# Patient Record
Sex: FEMALE | Employment: FULL TIME | URBAN - METROPOLITAN AREA
[De-identification: names, ages, dates, MRNs, and addresses within clinical notes are randomized per-mention and may not be internally consistent; named-entity substitution may affect disease eponyms.]

---

## 2020-05-29 ENCOUNTER — HOSPITAL ENCOUNTER (OUTPATIENT)
Facility: MEDICAL CENTER | Age: 55
End: 2020-05-29
Payer: COMMERCIAL

## 2020-06-03 LAB
SARS-COV-2 RNA SPEC QL NAA+PROBE: NOT DETECTED
SPECIMEN SOURCE: NORMAL

## 2022-10-01 ENCOUNTER — OFFICE VISIT (OUTPATIENT)
Dept: URGENT CARE | Facility: CLINIC | Age: 57
End: 2022-10-01
Payer: COMMERCIAL

## 2022-10-01 VITALS
WEIGHT: 148.8 LBS | SYSTOLIC BLOOD PRESSURE: 122 MMHG | BODY MASS INDEX: 28.09 KG/M2 | HEIGHT: 61 IN | RESPIRATION RATE: 18 BRPM | HEART RATE: 71 BPM | DIASTOLIC BLOOD PRESSURE: 74 MMHG | TEMPERATURE: 96.8 F | OXYGEN SATURATION: 95 %

## 2022-10-01 DIAGNOSIS — M54.9 MID-BACK PAIN, ACUTE: ICD-10-CM

## 2022-10-01 PROCEDURE — 99204 OFFICE O/P NEW MOD 45 MIN: CPT | Performed by: NURSE PRACTITIONER

## 2022-10-01 RX ORDER — IBUPROFEN 800 MG/1
800 TABLET ORAL EVERY 8 HOURS PRN
Qty: 90 TABLET | Refills: 0 | Status: SHIPPED | OUTPATIENT
Start: 2022-10-01

## 2022-10-01 RX ORDER — CYCLOBENZAPRINE HCL 10 MG
10 TABLET ORAL
Qty: 20 TABLET | Refills: 0 | Status: SHIPPED | OUTPATIENT
Start: 2022-10-01

## 2022-10-01 NOTE — LETTER
October 1, 2022        Edwin oseguera Cecilia Veliz  [address]  [place] [address]        Ms. Vish Veliz was seen in our clinic today and she is excused from work for tomorrow, Monday and Tuesday. Thank you.  If you have any questions or concerns, please don't hesitate to call.        Sincerely,        CLARISSE Aldridge.P.ALISSA.    Electronically Signed

## 2022-10-16 ASSESSMENT — ENCOUNTER SYMPTOMS
CHILLS: 0
MYALGIAS: 1
FEVER: 0
ABDOMINAL PAIN: 0
FOCAL WEAKNESS: 0
BACK PAIN: 1
TINGLING: 0
SENSORY CHANGE: 0

## 2022-10-16 NOTE — PROGRESS NOTES
Subjective     Ma de Cecilia Veliz is a 57 y.o. female who presents with Back Pain (Pt has back pain, rib pain hurts when taking a breath x 1 month)            HPI New.  Patient is a 57-year-old female who presents with mid back pain that radiates around to her ribs x1 month.  She states her washing machine was going out of balance and she tried to hold onto it and this is when her back pain started.  She denies any radiation of this pain down either one of her legs, bowel or bladder issues, chest pain, or focal weakness.  She reports that she has been doing ibuprofen and icing to this area with no improvement in her symptoms however she does work as a .    Patient has no known allergies.  No current outpatient medications on file prior to visit.     No current facility-administered medications on file prior to visit.     Social History     Socioeconomic History    Marital status: Unknown     Spouse name: Not on file    Number of children: Not on file    Years of education: Not on file    Highest education level: Not on file   Occupational History    Not on file   Tobacco Use    Smoking status: Never    Smokeless tobacco: Never   Vaping Use    Vaping Use: Never used   Substance and Sexual Activity    Alcohol use: Not Currently    Drug use: Not Currently    Sexual activity: Not on file   Other Topics Concern    Not on file   Social History Narrative    Not on file     Social Determinants of Health     Financial Resource Strain: Not on file   Food Insecurity: Not on file   Transportation Needs: Not on file   Physical Activity: Not on file   Stress: Not on file   Social Connections: Not on file   Intimate Partner Violence: Not on file   Housing Stability: Not on file     Breast Cancer-related family history is not on file.      Review of Systems   Constitutional:  Negative for chills and fever.   Cardiovascular:  Negative for chest pain.   Gastrointestinal:  Negative for abdominal pain.  "  Musculoskeletal:  Positive for back pain and myalgias.   Neurological:  Negative for tingling, sensory change and focal weakness.            Objective     /74 (BP Location: Left arm, Patient Position: Sitting, BP Cuff Size: Adult)   Pulse 71   Temp 36 °C (96.8 °F) (Temporal)   Resp 18   Ht 1.54 m (5' 0.63\")   Wt 67.5 kg (148 lb 12.8 oz)   SpO2 95%   BMI 28.46 kg/m²      Physical Exam  Constitutional:       Appearance: Normal appearance.   Cardiovascular:      Rate and Rhythm: Normal rate and regular rhythm.      Heart sounds: No murmur heard.  Pulmonary:      Effort: Pulmonary effort is normal.      Breath sounds: Normal breath sounds.   Musculoskeletal:      Thoracic back: Tenderness present. No swelling, deformity, lacerations or bony tenderness. Normal range of motion.   Skin:     General: Skin is warm and dry.   Neurological:      General: No focal deficit present.      Mental Status: She is alert and oriented to person, place, and time.   Psychiatric:         Mood and Affect: Mood normal.         Behavior: Behavior normal.                           Assessment & Plan        1. Mid-back pain, acute  ibuprofen (MOTRIN) 800 MG Tab    cyclobenzaprine (FLEXERIL) 10 mg Tab    Referral to Physical Therapy    Referral to establish with [facility]        Differential diagnosis, natural history, supportive care, and indications for immediate follow-up discussed at length.           "

## 2024-01-12 ENCOUNTER — APPOINTMENT (OUTPATIENT)
Dept: RADIOLOGY | Facility: IMAGING CENTER | Age: 59
End: 2024-01-12
Payer: COMMERCIAL

## 2024-01-12 ENCOUNTER — NON-PROVIDER VISIT (OUTPATIENT)
Dept: URGENT CARE | Facility: CLINIC | Age: 59
End: 2024-01-12
Payer: COMMERCIAL

## 2024-01-12 VITALS
TEMPERATURE: 97.5 F | DIASTOLIC BLOOD PRESSURE: 84 MMHG | OXYGEN SATURATION: 97 % | SYSTOLIC BLOOD PRESSURE: 138 MMHG | BODY MASS INDEX: 25.56 KG/M2 | HEIGHT: 64 IN | WEIGHT: 149.7 LBS | RESPIRATION RATE: 18 BRPM | HEART RATE: 83 BPM

## 2024-01-12 DIAGNOSIS — Z02.6 ENCOUNTER RELATED TO WORKER'S COMPENSATION CLAIM: ICD-10-CM

## 2024-01-12 DIAGNOSIS — S49.92XS ARM INJURY, LEFT, SEQUELA: ICD-10-CM

## 2024-01-12 DIAGNOSIS — M79.642 LEFT HAND PAIN: ICD-10-CM

## 2024-01-12 DIAGNOSIS — M79.602 PAIN OF LEFT UPPER EXTREMITY: ICD-10-CM

## 2024-01-12 LAB
AMP AMPHETAMINE: NORMAL
BREATH ALCOHOL COMMENT: NORMAL
COC COCAINE: NORMAL
INT CON NEG: NORMAL
INT CON POS: NORMAL
MET METHAMPHETAMINES: NORMAL
OPI OPIATES: NORMAL
PCP PHENCYCLIDINE: NORMAL
POC BREATHALIZER: 0 PERCENT (ref 0–0.01)
POC DRUG COMMENT 753798-OCCUPATIONAL HEALTH: NEGATIVE
THC: NORMAL

## 2024-01-12 PROCEDURE — 99214 OFFICE O/P EST MOD 30 MIN: CPT

## 2024-01-12 PROCEDURE — 80305 DRUG TEST PRSMV DIR OPT OBS: CPT

## 2024-01-12 PROCEDURE — 82075 ASSAY OF BREATH ETHANOL: CPT

## 2024-01-12 PROCEDURE — 73130 X-RAY EXAM OF HAND: CPT | Mod: TC,LT

## 2024-01-12 NOTE — LETTER
"    EMPLOYEE’S CLAIM FOR COMPENSATION/ REPORT OF INITIAL TREATMENT  FORM C-4  PLEASE TYPE OR PRINT    EMPLOYEE’S CLAIM - PROVIDE ALL INFORMATION REQUESTED   First Name                    WILDER Padilla Last Name  Vish Gordillo Birthdate                    1965                Sex  []M  []F Claim Number (Insurer’s Use Only)     Home Address  [address] Age  58 y.o. 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 (if applicable)  [employer]    How did this injury or occupational disease occur? (Be specific and answer in detail. Use additional sheet if necessary)  Mikhail melton   If you believe that you have an occupational disease, when did you first have knowledge of the disability and its relationship to your employment?  N/A Witnesses to the Accident (if applicable)  N/A      Nature of Injury or Occupational Disease  Strain  Part(s) of Body Injured or Affected  Lower Arm (L) Hand (L) Upper Arm (L)    I CERTIFY THAT THE ABOVE IS TRUE AND CORRECT TO T HE BEST OF MY KNOWLEDGE " AND THAT I HAVE PROVIDED THIS INFORMATION IN ORDER TO OBTAIN THE BENEFITS OF [place], OR [place]).  I HEREBY AUTHORIZE ANY PHYSICIAN, CHIROPRACTOR, SURGEON, PRACTITIONER OR ANY OTHER PERSON, ANY HOSPITAL, INCLUDING [facility] OR [facility], ANY  MEDICAL SERVICE ORGANIZATION, ANY INSURANCE COMPANY, OR OTHER INSTITUTION OR ORGANIZATION TO RELEASE TO EACH OTHER, ANY MEDICAL OR OTHER INFORMATION, INCLUDING BENEFITS PAID OR PAYABLE, PERTINENT TO THIS INJURY OR DISEASE, EXCEPT INFORMATION RELATIVE TO DIAGNOSIS, TREATMENT AND/OR COUNSELING FOR AIDS, PSYCHOLOGICAL CONDITIONS, ALCOHOL OR CONTROLLED SUBSTANCES, FOR WHICH I MUST GIVE SPECIFIC AUTHORIZATION.  A PHOTOSTAT OF THIS AUTHORIZATION SHALL BE VALID AS THE ORIGINAL.     Date 1/12/24   [facility] Employee’s Original or  *Electronic Signature   THIS REPORT MUST BE COMPLETED AND MAILED WITHIN 3 WORKING DAYS OF TREATMENT   [facility]    Name of [facility]   Date 1/12/2024 Diagnosis and Description of Injury or Occupational Disease  (M79.642) Left hand pain  (M79.602) Pain of left upper extremity  Diagnoses of Left hand pain and Pain of left upper extremity were pertinent to this visit. Is there evidence that the injured employee was under the influence of alcohol and/or another controlled substance at the time of accident?  []No  [] Yes (if yes, please explain)   Hour 12:50 PM  No   Treatment: No major heavy lifting   Limit activity to the left arm/hand   Ice for pain and swelling   Motrin and tylenol for pain as needed   Follow up on Monday for considerations of release.     Have you advised the patient to remain off work five days or more?   [] Yes Indicate dates: From   To    []No If no, is the injured employee capable of: [] full duty [] modified duty                                                             No  Yes  If  modified duty, specify any limitations / restrictions:  No major heavy lifting   Limit activity to the left arm/hand   Ice for pain and swelling   Motrin and tylenol for pain as needed   Follow up on Monday for considerations of release.                                                                                                                                                                                                                                                                                                                                                                                                                X-Ray Findings: Negative    From information given by the employee, together with medical evidence, can you directly connect this injury or occupational disease as job incurred?  []Yes   [] No Yes    Is additional medical care by a physician indicated? []Yes [] No  Yes    Do you know of any previous injury or disease contributing to this condition or occupational disease? []Yes [] No (Explain if yes)                          No   Date  1/12/2024 Print Health Care Provider’s Name  CELIO Rangel I certify that the employer’s copy of  this form was delivered to the employer on:   Address  [address] [place] [address] INSURER'S USE ONLY                       [facility]  [id number] Provider’s Tax ID Number  [id number]   Telephone  Dept: [phone]    Health Care Provider’s Original or Electronic Signature  e-NEGIN Valdez Degree (MD,DO, DC,PA-C,APRN)  APRN  Choose (if applicable)      ORIGINAL - TREATING HEALTHCARE PROVIDER PAGE 2 - INSURER/TPA PAGE 3 - EMPLOYER PAGE 4 - EMPLOYEE             Form C-4 (rev.08/23)        BRIEF DESCRIPTION OF RIGHTS AND BENEFITS  (Pursuant to [place] 616C.050)    Notice of Injury or Occupational Disease (Incident Report Form C-1): If an injury or occupational disease (OD) arises out of and in the course of employment, you must provide  "written notice to your employer as soon as practicable, but no later than 7 days after the accident or OD. Your employer shall maintain a sufficient supply of the required forms.    Claim for Compensation (Form C-4): If medical treatment is sought, the form C-4 is available at the place of initial treatment. A completed \"Claim for Compensation\" (Form C-4) must be filed within 90 days after an accident or OD. The treating physician or chiropractor must, within 3 working days after treatment, complete and mail to the employer, the employer's insurer and third-party , the Claim for Compensation.    Medical Treatment: If you require medical treatment for your on-the-job injury or OD, you may be required to select a physician or chiropractor from a list provided by your workers’ compensation insurer, if it has contracted with an Organization for Managed Care (MCO) or Preferred Provider Organization (PPO) or providers of health care. If your employer has not entered into a contract with an MCO or PPO, you may select a physician or chiropractor from the Panel of Physicians and Chiropractors. Any medical costs related to your industrial injury or OD will be paid by your insurer.    Temporary Total Disability (TTD): If your doctor has certified that you are unable to work for a period of at least 5 consecutive days, or 5 cumulative days in a 20-day period, or places restrictions on you that your employer does not accommodate, you may be entitled to TTD compensation.    Temporary Partial Disability (TPD): If the wage you receive upon reemployment is less than the compensation for TTD to which you are entitled, the insurer may be required to pay you TPD compensation to make up the difference. TPD can only be paid for a maximum of 24 months.    Permanent Partial Disability (PPD): When your medical condition is stable and there is an indication of a PPD as a result of your injury or OD, within 30 days, your insurer " must arrange for an evaluation by a rating physician or chiropractor to determine the degree of your PPD. The amount of your PPD award depends on the date of injury, the results of the PPD evaluation, your age and wage.    Permanent Total Disability (PTD): If you are medically certified by a treating physician or chiropractor as permanently and totally disabled and have been granted a PTD status by your insurer, you are entitled to receive monthly benefits not to exceed 66 2/3% of your average monthly wage. The amount of your PTD payments is subject to reduction if you previously received a lump-sum PPD award.    Vocational Rehabilitation Services: You may be eligible for vocational rehabilitation services if you are unable to return to the job due to a permanent physical impairment or permanent restrictions as a result of your injury or occupational disease.    Transportation and Per Irma Reimbursement: You may be eligible for travel expenses and per irma associated with medical treatment.    Reopening: You may be able to reopen your claim if your condition worsens after claim closure.     Appeal Process: If you disagree with a written determination issued by the insurer or the insurer does not respond to your request, you may appeal to the Department of Administration, , by following the instructions contained in your determination letter. You must appeal the determination within 70 days from the date of the determination letter at [address], [address], [place], [place] [address], or [address] [facility] [address], [place] [address]. If you disagree with the  decision, you may appeal to the Department of Administration, . You must file your appeal within 30 days from the date of the  decision letter at [address], [address], [place] [address], or [address] [facility] [address], [place] [address]. If you  disagree with a decision of an , you may file a petition for judicial review with the District Court. You must do so within 30 days of the Appeal Officer’s decision. You may be represented by an  at your own expense or you may contact the [facility] for possible representation.    [place]  for Injured Workers (NAIW): If you disagree with a  decision, you may request that NAIW represent you without charge at an  Hearing. For information regarding denial of benefits, you may contact the [facility] at: [address], [address], [place] [address], [phone], or [address] [address], [phone]    To File a Complaint with the Division: If you wish to file a complaint with the  of the Division of Industrial Relations (DIR),  please contact the Workers’ Compensation Section, [address], [address], [place], [place] [address], telephone [phone], or [address], [address], [place], [place] [address], telephone [phone].    For assistance with Workers’ Compensation Issues: You may contact the [facility] Office for Consumer Health Assistance, [address], [address], [place], [place] [address], Toll Free 1-344.151.9853, Web site: http://[facility].[place].gov/Programs/LO E-mail: [email or web address]              __________________________________________________________________                                    _________________            Employee Name / Signature                                                                                                                            Date                                                                                                                                                                                                                              D-2 (rev. 10/20)

## 2024-01-12 NOTE — LETTER
[facility] [address] [place] [address]  Phone:  [phone] - Fax:  [phone]   [facility] Progress Report and Disability Certification  Date of Service: 1/12/2024   No Show:  No  Date / Time of Next Visit: 1/15/2024   Claim Information   Patient Name: Valerie Gordillo  Claim Number:     Employer: [employer]  Date of Injury: 1/11/2024     Insurer / TPA: [employer]  ID / SSN:     Occupation: Room Attendent  Diagnosis: Diagnoses of Left hand pain and Pain of left upper extremity were pertinent to this visit.    Medical Information   Related to Industrial Injury? Yes    Subjective Complaints:  Was at work yesterday when she was making a bed and noted pain to her left hand and radiates up into her for arm.  She states her  feels weak to this side as well.  No previous history of pain to this arm.     Objective Findings: Patient has full mobility of her left elbow, full mobility of her wrist.  Noted limited mobility of her left hand.  Bruising and swelling noted to her left hand and into her wrist.  She has full mobility of her fingers.     Pre-Existing Condition(s):     Assessment:   Initial Visit    Status: Additional Care Required  Permanent Disability:No    Plan: Diagnostics    Diagnostics: X-ray    Comments:  Negative for fracture     Disability Information   Status: Released to Restricted Duty    From:  1/12/2024  Through: 1/15/2024 Restrictions are: Temporary   Physical Restrictions   Sitting:    Standing:    Stooping:    Bending:      Squatting:    Walking:    Climbing:    Pushing:  < or = to 4 hrs/day   Pulling:  < or = to 4 hrs/day Other:    Reaching Above Shoulder (L):   Reaching Above Shoulder (R):       Reaching Below Shoulder (L):    Reaching Below Shoulder (R):      Not to exceed Weight Limits   Carrying(hrs):   Weight Limit(lb): < or = to 10 pounds Lifting(hrs):   Weight  Limit(lb): < or = to 10 pounds   Comments: No  major heavy lifting   Limit activity to the left arm/hand   Ice for pain and swelling   Motrin and tylenol for pain as needed   Follow up on Monday for considerations of release.      Repetitive Actions   Hands: i.e. Fine Manipulations from Grasping: < or = to 4 hrs/day   Feet: i.e. Operating Foot Controls:     Driving / Operate Machinery:     Health Care Provider’s Original or Electronic Signature  CELIO Rangel Health Care Provider’s Original or Electronic Signature    Deonte Moralez DO MPH     Clinic Name / Location: [address] [place] [address] Clinic Phone Number: Dept: [phone]   Appointment Time: 11:45 Am Visit Start Time: 12:50 PM   Check-In Time:  12:00 Pm Visit Discharge Time:  2:08 PM   Original-Treating Physician or Chiropractor    Page 2-Insurer/TPA    Page 3-Employer    Page 4-Employee

## 2024-01-12 NOTE — PROGRESS NOTES
"Subjective:     Valerie Gordillo is a 58 y.o. female who presents for Other (NEW WC DOI: 01/11/24 (L) hand pain, swelling )      Was at work yesterday when she was making a bed and noted pain to her left hand and radiates up into her for arm.  She states her  feels weak to this side as well.  No previous history of pain to this arm.      PMH:   No pertinent past medical history to this problem  MEDS:  Medications were reviewed in EMR  ALLERGIES:  Allergies were reviewed in EMR  SOCHX:  Works as a    FH:   No pertinent family history to this problem       Objective:     /84 (BP Location: Right arm, Patient Position: Sitting, BP Cuff Size: Adult)   Pulse 83   Temp 36.4 °C (97.5 °F) (Temporal)   Resp 18   Ht 1.63 m (5' 4.17\")   Wt 67.9 kg (149 lb 11.2 oz)   SpO2 97%   BMI 25.56 kg/m²     Patient has full mobility of her left elbow, full mobility of her wrist.  Noted limited mobility of her left hand.  Bruising and swelling noted to her left hand and into her wrist.  She has full mobility of her fingers.      Assessment/Plan:   X-ray imaging was negative for any fracture to the hand.  Total time spent with the patient 35 minutes to include, review of chart, charting, assessment, procedure.    1. Left hand pain  - DX-HAND 3+ LEFT; Future    2. Pain of left upper extremity    Released to Restricted Duty FROM 1/12/2024 TO 1/15/2024  No major heavy lifting   Limit activity to the left arm/hand   Ice for pain and swelling   Motrin and tylenol for pain as needed   Follow up on Monday for considerations of release.    Negative for fracture     Differential diagnosis, natural history, supportive care, and indications for immediate follow-up discussed.    "

## 2024-01-15 ENCOUNTER — OCCUPATIONAL MEDICINE (OUTPATIENT)
Dept: URGENT CARE | Facility: CLINIC | Age: 59
End: 2024-01-15
Payer: COMMERCIAL

## 2024-01-15 VITALS
OXYGEN SATURATION: 98 % | SYSTOLIC BLOOD PRESSURE: 122 MMHG | TEMPERATURE: 97.5 F | RESPIRATION RATE: 14 BRPM | DIASTOLIC BLOOD PRESSURE: 80 MMHG | WEIGHT: 149 LBS | HEIGHT: 64 IN | BODY MASS INDEX: 25.44 KG/M2 | HEART RATE: 76 BPM

## 2024-01-15 DIAGNOSIS — G89.29 CHRONIC PAIN OF LEFT UPPER EXTREMITY: ICD-10-CM

## 2024-01-15 DIAGNOSIS — M79.602 CHRONIC PAIN OF LEFT UPPER EXTREMITY: ICD-10-CM

## 2024-01-15 DIAGNOSIS — M62.81 MUSCLE WEAKNESS OF LEFT UPPER EXTREMITY: ICD-10-CM

## 2024-01-15 PROCEDURE — 3079F DIAST BP 80-89 MM HG: CPT | Performed by: FAMILY MEDICINE

## 2024-01-15 PROCEDURE — 99214 OFFICE O/P EST MOD 30 MIN: CPT | Performed by: FAMILY MEDICINE

## 2024-01-15 PROCEDURE — 3074F SYST BP LT 130 MM HG: CPT | Performed by: FAMILY MEDICINE

## 2024-01-15 NOTE — LETTER
[facility] [address] [place] [address]  Phone:  [phone] - Fax:  [phone]   [facility] Progress Report and Disability Certification  Date of Service: 1/15/2024   No Show:  No  Date / Time of Next Visit:  1/22/24 8:15 AM @ Occupational Health   Claim Information   Patient Name: Valerie Gordillo  Claim Number:     Employer: [employer]  Date of Injury: 1/11/2024     Insurer / TPA: [employer]  ID / SSN:     Occupation: Room Attendent  Diagnosis: Diagnoses of Chronic pain of left upper extremity and Muscle weakness of left upper extremity were pertinent to this visit.    Medical Information   Related to Industrial Injury? Yes    Subjective Complaints:  Pain and weakness in left arm, hand and fingers, not improved since last visit.   Objective Findings: Pain from elbow down to her fingers, weakness, cannot , cannot pull arm against resistance, complains of pain in the arm.   Pre-Existing Condition(s):     Assessment:   Condition Same    Status: Additional Care Required  Permanent Disability:No    Plan: MedicationOT  Comments:Continue with ibuprofen, refer to neurology for consult, have patient seen in occupational medicine.    Diagnostics:      Comments:  Follow up with Occupational medicine    Disability Information   Status: Released to Restricted Duty    From:     Through:   Restrictions are:     Physical Restrictions   Sitting:    Standing:    Stooping:    Bending:      Squatting:    Walking:    Climbing:    Pushing:      Pulling:    Other:    Reaching Above Shoulder (L):   Reaching Above Shoulder (R):       Reaching Below Shoulder (L):    Reaching Below Shoulder (R):      Not to exceed Weight Limits   Carrying(hrs):   Weight Limit(lb):   Lifting(hrs):   Weight  Limit(lb):     Comments: Unable to use left hand  Refer to occupational med  Refer to Neurology    Repetitive Actions   Hands: i.e. Fine Manipulations from Grasping:      Feet: i.e. Operating Foot Controls:     Driving / Operate Machinery:     Health Care Provider’s Original or Electronic Signature  Zachariah Guerin M.D. Health Care Provider’s Original or Electronic Signature    Deonte Moralez DO MPH     Clinic Name / Location: [address] [place] [address] Clinic Phone Number: Dept: [phone]   Appointment Time: 11:00 Am Visit Start Time: 10:57 AM   Check-In Time:  10:43 Am Visit Discharge Time:     Original-Treating Physician or Chiropractor    Page 2-Insurer/TPA    Page 3-Employer    Page 4-Employee

## 2024-01-15 NOTE — PROGRESS NOTES
"Subjective:     Valerie Gordillo is a 58 y.o. female who presents for Follow-Up (Work comp pt states she is not feeling any better)      Pain and weakness in left arm, hand and fingers, not improved since last visit.    PMH:   No pertinent past medical history to this problem  MEDS:  Medications were reviewed in EMR  ALLERGIES:  Allergies were reviewed in EMR  SOCHX:  Works as a   FH:   No pertinent family history to this problem       Objective:     /80 (BP Location: Right arm, Patient Position: Sitting, BP Cuff Size: Adult long)   Pulse 76   Temp 36.4 °C (97.5 °F) (Temporal)   Resp 14   Ht 1.626 m (5' 4\")   Wt 67.6 kg (149 lb)   SpO2 98%   BMI 25.58 kg/m²     Pain from elbow down to her fingers, weakness, cannot , cannot pull arm against resistance, complains of pain in the arm.    Assessment/Plan:       1. Chronic pain of left upper extremity    2. Muscle weakness of left upper extremity  - Referral to Pediatric Neurology  - Referral to Occupational Medicine    Released to Restricted Duty FROM   TO    Unable to use left hand  Refer to occupational med  Refer to Neurology  Follow up with Occupational medicine    Differential diagnosis, natural history, supportive care, and indications for immediate follow-up discussed.    "